# Patient Record
Sex: MALE | Race: WHITE | NOT HISPANIC OR LATINO | Employment: UNEMPLOYED | URBAN - METROPOLITAN AREA
[De-identification: names, ages, dates, MRNs, and addresses within clinical notes are randomized per-mention and may not be internally consistent; named-entity substitution may affect disease eponyms.]

---

## 2017-06-08 ENCOUNTER — HOSPITAL ENCOUNTER (EMERGENCY)
Facility: HOSPITAL | Age: 47
Discharge: HOME | End: 2017-06-08
Attending: EMERGENCY MEDICINE

## 2017-06-08 VITALS
SYSTOLIC BLOOD PRESSURE: 132 MMHG | HEART RATE: 55 BPM | TEMPERATURE: 98 F | BODY MASS INDEX: 34.36 KG/M2 | HEIGHT: 70 IN | DIASTOLIC BLOOD PRESSURE: 78 MMHG | RESPIRATION RATE: 19 BRPM | WEIGHT: 240 LBS | OXYGEN SATURATION: 98 %

## 2017-06-08 DIAGNOSIS — R31.9 URINARY TRACT INFECTION WITH HEMATURIA, SITE UNSPECIFIED: ICD-10-CM

## 2017-06-08 DIAGNOSIS — K59.00 CONSTIPATION: ICD-10-CM

## 2017-06-08 DIAGNOSIS — N39.0 URINARY TRACT INFECTION WITH HEMATURIA, SITE UNSPECIFIED: ICD-10-CM

## 2017-06-08 DIAGNOSIS — R42 DIZZINESS: Primary | ICD-10-CM

## 2017-06-08 LAB
ALBUMIN SERPL BCP-MCNC: 4.3 G/DL
ALP SERPL-CCNC: 70 U/L
ALT SERPL W/O P-5'-P-CCNC: 31 U/L
ANION GAP SERPL CALC-SCNC: 15 MMOL/L
AST SERPL-CCNC: 22 U/L
BACTERIA #/AREA URNS HPF: ABNORMAL /HPF
BASOPHILS # BLD AUTO: 0.04 K/UL
BASOPHILS NFR BLD: 0.6 %
BILIRUB SERPL-MCNC: 0.4 MG/DL
BILIRUB UR QL STRIP: ABNORMAL
BNP SERPL-MCNC: 14 PG/ML
BUN SERPL-MCNC: 11 MG/DL
CALCIUM SERPL-MCNC: 9.8 MG/DL
CHLORIDE SERPL-SCNC: 106 MMOL/L
CK SERPL-CCNC: 85 U/L
CLARITY UR: CLEAR
CO2 SERPL-SCNC: 19 MMOL/L
COLOR UR: YELLOW
CREAT SERPL-MCNC: 1 MG/DL
DIFFERENTIAL METHOD: ABNORMAL
EOSINOPHIL # BLD AUTO: 0 K/UL
EOSINOPHIL NFR BLD: 0.6 %
ERYTHROCYTE [DISTWIDTH] IN BLOOD BY AUTOMATED COUNT: 12.3 %
EST. GFR  (AFRICAN AMERICAN): >60 ML/MIN/1.73 M^2
EST. GFR  (NON AFRICAN AMERICAN): >60 ML/MIN/1.73 M^2
GLUCOSE SERPL-MCNC: 174 MG/DL
GLUCOSE UR QL STRIP: ABNORMAL
HCT VFR BLD AUTO: 45.5 %
HGB BLD-MCNC: 16.6 G/DL
HGB UR QL STRIP: NEGATIVE
KETONES UR QL STRIP: ABNORMAL
LEUKOCYTE ESTERASE UR QL STRIP: NEGATIVE
LYMPHOCYTES # BLD AUTO: 1.7 K/UL
LYMPHOCYTES NFR BLD: 25.6 %
MCH RBC QN AUTO: 31.9 PG
MCHC RBC AUTO-ENTMCNC: 36.5 %
MCV RBC AUTO: 87 FL
MICROSCOPIC COMMENT: ABNORMAL
MONOCYTES # BLD AUTO: 0.6 K/UL
MONOCYTES NFR BLD: 8.5 %
NEUTROPHILS # BLD AUTO: 4.4 K/UL
NEUTROPHILS NFR BLD: 64.7 %
NITRITE UR QL STRIP: NEGATIVE
PH UR STRIP: 6 [PH] (ref 5–8)
PLATELET # BLD AUTO: 232 K/UL
PMV BLD AUTO: 11 FL
POTASSIUM SERPL-SCNC: 3.3 MMOL/L
PROT SERPL-MCNC: 7.8 G/DL
PROT UR QL STRIP: NEGATIVE
RBC # BLD AUTO: 5.21 M/UL
RBC #/AREA URNS HPF: 5 /HPF (ref 0–4)
SODIUM SERPL-SCNC: 140 MMOL/L
SP GR UR STRIP: 1.02 (ref 1–1.03)
TROPONIN I SERPL DL<=0.01 NG/ML-MCNC: 0.01 NG/ML
URN SPEC COLLECT METH UR: ABNORMAL
UROBILINOGEN UR STRIP-ACNC: 1 EU/DL
WBC # BLD AUTO: 6.8 K/UL
WBC #/AREA URNS HPF: >100 /HPF (ref 0–5)
YEAST URNS QL MICRO: ABNORMAL

## 2017-06-08 PROCEDURE — 85025 COMPLETE CBC W/AUTO DIFF WBC: CPT

## 2017-06-08 PROCEDURE — 93005 ELECTROCARDIOGRAM TRACING: CPT

## 2017-06-08 PROCEDURE — 80053 COMPREHEN METABOLIC PANEL: CPT

## 2017-06-08 PROCEDURE — 81000 URINALYSIS NONAUTO W/SCOPE: CPT

## 2017-06-08 PROCEDURE — 96360 HYDRATION IV INFUSION INIT: CPT

## 2017-06-08 PROCEDURE — 84484 ASSAY OF TROPONIN QUANT: CPT

## 2017-06-08 PROCEDURE — 99284 EMERGENCY DEPT VISIT MOD MDM: CPT | Mod: 25

## 2017-06-08 PROCEDURE — 83880 ASSAY OF NATRIURETIC PEPTIDE: CPT

## 2017-06-08 PROCEDURE — 82550 ASSAY OF CK (CPK): CPT

## 2017-06-08 PROCEDURE — 25000003 PHARM REV CODE 250: Performed by: EMERGENCY MEDICINE

## 2017-06-08 PROCEDURE — 93010 ELECTROCARDIOGRAM REPORT: CPT | Mod: ,,, | Performed by: INTERNAL MEDICINE

## 2017-06-08 RX ORDER — MECLIZINE HYDROCHLORIDE 25 MG/1
25 TABLET ORAL
Status: COMPLETED | OUTPATIENT
Start: 2017-06-08 | End: 2017-06-08

## 2017-06-08 RX ORDER — MECLIZINE HYDROCHLORIDE 25 MG/1
25 TABLET ORAL 3 TIMES DAILY PRN
Qty: 20 TABLET | Refills: 0 | Status: SHIPPED | OUTPATIENT
Start: 2017-06-08 | End: 2018-04-03

## 2017-06-08 RX ORDER — CEFUROXIME AXETIL 500 MG/1
500 TABLET ORAL 2 TIMES DAILY
Qty: 20 TABLET | Refills: 0 | Status: SHIPPED | OUTPATIENT
Start: 2017-06-08 | End: 2017-06-18

## 2017-06-08 RX ADMIN — SODIUM CHLORIDE 1000 ML: 0.9 INJECTION, SOLUTION INTRAVENOUS at 03:06

## 2017-06-08 RX ADMIN — MECLIZINE HYDROCHLORIDE 25 MG: 25 TABLET ORAL at 03:06

## 2017-06-08 NOTE — ED PROVIDER NOTES
"SCRIBE #1 NOTE: I, Rissa Hailey, am scribing for, and in the presence of, Avelino Childress MD. I have scribed the entire note.      History      Chief Complaint   Patient presents with    Dizziness     pt reports syncopal episode this morning while sleeping, states "the dizziness woke him from his sleep." he states it feels like a weight is sitting on his chest, denies shortness of breath       Review of patient's allergies indicates:   Allergen Reactions    Pneumococcal 23-phan ps vaccine      Patient refuses injection        HPI   HPI    6/8/2017, 2:56 PM   History obtained from the patient      History of Present Illness: Aravind Dorman is a 47 y.o. male patient, with a PMHx of DM, who presents to the Emergency Department for dizziness which onset gradually earlier today at approximately 12:30 PM. Pt states that dizziness onset after walking up from sleep. Sx have been constant and moderate in severity. No modifying factors noted. Associated sx include nausea. Pt denies any fever, chills, CP, SOB, vomiting, HA, speech difficulty, or weakness/numbness. No further complaints at this time.       Arrival mode: Personal vehicle      PCP: Primary Doctor No       Past Medical History:  Past Medical History:   Diagnosis Date    Anxiety     Depression     Diabetes mellitus     History of psychiatric hospitalization     Hx of psychiatric care     Hypertension     Psychiatric exam requested by authority     Psychiatric problem     Self-harming behavior     Suicide attempt        Past Surgical History:  Reviewed. Not pertinent       Family History:  Reviewed. Not pertinent     Social History:  Social History     Social History Main Topics    Smoking status: Never Smoker    Smokeless tobacco: Unknown     Alcohol use Yes    Drug use: No    Sexual activity: Unknown        ROS   Review of Systems   Constitutional: Negative for chills, diaphoresis and fever.   HENT: Negative for sore throat.    Respiratory: " "Negative for shortness of breath.    Cardiovascular: Negative for chest pain.   Gastrointestinal: Positive for nausea. Negative for abdominal pain, diarrhea and vomiting.   Genitourinary: Negative for dysuria.   Musculoskeletal: Negative for back pain.   Skin: Negative for rash.   Neurological: Positive for dizziness. Negative for weakness, light-headedness, numbness and headaches.   Hematological: Does not bruise/bleed easily.     Physical Exam      Initial Vitals [06/08/17 1426]   BP Pulse Resp Temp SpO2   (!) 176/92 94 20 98.8 °F (37.1 °C) 98 %      Physical Exam  Nursing Notes and Vital Signs Reviewed.  Constitutional: Patient is in no acute distress. Well-developed and well-nourished.  Head: Atraumatic. Normocephalic.  Eyes: PERRL. EOM intact. Conjunctivae are not pale. No scleral icterus.  ENT: Mucous membranes are moist. Oropharynx is clear and symmetric.    Neck: Supple. Full ROM. No lymphadenopathy.  Cardiovascular: Regular rate. Regular rhythm. No murmurs, rubs, or gallops. Distal pulses are 2+ and symmetric.  Pulmonary/Chest: No respiratory distress. Clear to auscultation bilaterally. No wheezing, rales, or rhonchi.  Abdominal: Soft and non-distended.  There is no tenderness.  No rebound, guarding, or rigidity.   Musculoskeletal: Moves all extremities. No obvious deformities. No edema. No calf tenderness.  Skin: Warm and dry.  Neurological:  Alert, awake, and appropriate.  Normal speech.  No acute focal neurological deficits are appreciated.  Psychiatric: Normal affect. Good eye contact. Appropriate in content.    ED Course    Procedures  ED Vital Signs:  Vitals:    06/08/17 1426 06/08/17 1508 06/08/17 1509 06/08/17 1510   BP: (!) 176/92 (!) 150/86  (!) 145/93   Pulse: 94 79 78 84   Resp: 20  13    Temp: 98.8 °F (37.1 °C)      TempSrc: Oral      SpO2: 98% 96% 98%    Weight: 108.9 kg (240 lb)      Height: 5' 10" (1.778 m)       06/08/17 1512 06/08/17 1602 06/08/17 1702   BP: (!) 131/95 (!) 140/84 131/81 "   Pulse: 97 76 (!) 55   Resp:  17 19   Temp:      TempSrc:      SpO2:      Weight:      Height:          Abnormal Lab Results:  Labs Reviewed   CBC W/ AUTO DIFFERENTIAL - Abnormal; Notable for the following:        Result Value    MCH 31.9 (*)     MCHC 36.5 (*)     All other components within normal limits   COMPREHENSIVE METABOLIC PANEL - Abnormal; Notable for the following:     Potassium 3.3 (*)     CO2 19 (*)     Glucose 174 (*)     All other components within normal limits   URINALYSIS - Abnormal; Notable for the following:     Glucose, UA 3+ (*)     Ketones, UA Trace (*)     Bilirubin (UA) 1+ (*)     All other components within normal limits   URINALYSIS MICROSCOPIC - Abnormal; Notable for the following:     RBC, UA 5 (*)     WBC, UA >100 (*)     Bacteria, UA Few (*)     All other components within normal limits   B-TYPE NATRIURETIC PEPTIDE   CK   TROPONIN I        All Lab Results:  Results for orders placed or performed during the hospital encounter of 06/08/17   CBC auto differential   Result Value Ref Range    WBC 6.80 3.90 - 12.70 K/uL    RBC 5.21 4.60 - 6.20 M/uL    Hemoglobin 16.6 14.0 - 18.0 g/dL    Hematocrit 45.5 40.0 - 54.0 %    MCV 87 82 - 98 fL    MCH 31.9 (H) 27.0 - 31.0 pg    MCHC 36.5 (H) 32.0 - 36.0 %    RDW 12.3 11.5 - 14.5 %    Platelets 232 150 - 350 K/uL    MPV 11.0 9.2 - 12.9 fL    Gran # 4.4 1.8 - 7.7 K/uL    Lymph # 1.7 1.0 - 4.8 K/uL    Mono # 0.6 0.3 - 1.0 K/uL    Eos # 0.0 0.0 - 0.5 K/uL    Baso # 0.04 0.00 - 0.20 K/uL    Gran% 64.7 38.0 - 73.0 %    Lymph% 25.6 18.0 - 48.0 %    Mono% 8.5 4.0 - 15.0 %    Eosinophil% 0.6 0.0 - 8.0 %    Basophil% 0.6 0.0 - 1.9 %    Differential Method Automated    Comprehensive metabolic panel   Result Value Ref Range    Sodium 140 136 - 145 mmol/L    Potassium 3.3 (L) 3.5 - 5.1 mmol/L    Chloride 106 95 - 110 mmol/L    CO2 19 (L) 23 - 29 mmol/L    Glucose 174 (H) 70 - 110 mg/dL    BUN, Bld 11 6 - 20 mg/dL    Creatinine 1.0 0.5 - 1.4 mg/dL    Calcium 9.8  8.7 - 10.5 mg/dL    Total Protein 7.8 6.0 - 8.4 g/dL    Albumin 4.3 3.5 - 5.2 g/dL    Total Bilirubin 0.4 0.1 - 1.0 mg/dL    Alkaline Phosphatase 70 55 - 135 U/L    AST 22 10 - 40 U/L    ALT 31 10 - 44 U/L    Anion Gap 15 8 - 16 mmol/L    eGFR if African American >60 >60 mL/min/1.73 m^2    eGFR if non African American >60 >60 mL/min/1.73 m^2   Urinalysis   Result Value Ref Range    Specimen UA Urine, Clean Catch     Color, UA Yellow Yellow, Straw, Mare    Appearance, UA Clear Clear    pH, UA 6.0 5.0 - 8.0    Specific Gravity, UA 1.025 1.005 - 1.030    Protein, UA Negative Negative    Glucose, UA 3+ (A) Negative    Ketones, UA Trace (A) Negative    Bilirubin (UA) 1+ (A) Negative    Occult Blood UA Negative Negative    Nitrite, UA Negative Negative    Urobilinogen, UA 1.0 <2.0 EU/dL    Leukocytes, UA Negative Negative   Brain natriuretic peptide   Result Value Ref Range    BNP 14 0 - 99 pg/mL   CK   Result Value Ref Range    CPK 85 20 - 200 U/L   Troponin I   Result Value Ref Range    Troponin I 0.008 0.000 - 0.026 ng/mL   Urinalysis Microscopic   Result Value Ref Range    RBC, UA 5 (H) 0 - 4 /hpf    WBC, UA >100 (H) 0 - 5 /hpf    Bacteria, UA Few (A) None-Occ /hpf    Yeast, UA None None    Microscopic Comment SEE COMMENT          Imaging Results:  Imaging Results          X-Ray Abdomen Flat And Erect (Final result)  Result time 06/08/17 15:39:03    Final result by Magui Mac MD (06/08/17 15:39:03)                 Impression:     Negative two-view abdominal series.      Electronically signed by: MAGUI MAC MD  Date:     06/08/17  Time:    15:39              Narrative:    Procedure: XR ABDOMEN FLAT AND ERECT, 06/08/17 15:29:27    History: Constipation    Two views of the abdomen. The bowel gas pattern is unremarkable. No obstruction, ileus or free air.    Bony structures are grossly normal.                             X-Ray Chest PA And Lateral (Final result)  Result time 06/08/17 15:38:34    Final  result by Magui Mac MD (06/08/17 15:38:34)                 Impression:         Negative two-view chest x-ray.      Electronically signed by: MAGUI MAC MD  Date:     06/08/17  Time:    15:38              Narrative:    XR CHEST PA AND LATERAL, 06/08/17 15:29:32    Clinical indication: Dizziness.  Shortness of breath.    Findings:   Heart size is normal. The lung fields are clear. No acute pulmonary infiltrate.                                      The Emergency Provider reviewed the vital signs and test results, which are outlined above.    ED Discussion     4:58 PM: Reassessed pt at this time. Pt states that sx have improved after meclizine. Discussed with pt all pertinent ED information and results. Discussed pt dx and plan of tx. Gave pt all f/u and return to the ED instructions. All questions and concerns were addressed at this time. Pt expresses understanding of information and instructions, and is comfortable with plan to discharge. Pt is stable for discharge.      ED Medication(s):  Medications   meclizine tablet 25 mg (25 mg Oral Given 6/8/17 1540)   sodium chloride 0.9% bolus 1,000 mL (1,000 mLs Intravenous New Bag 6/8/17 1540)       New Prescriptions    CEFUROXIME (CEFTIN) 500 MG TABLET    Take 1 tablet (500 mg total) by mouth 2 (two) times daily.    MECLIZINE (ANTIVERT) 25 MG TABLET    Take 1 tablet (25 mg total) by mouth 3 (three) times daily as needed.       Follow-up Information     [facility] in 2 days.    Contact information:  [address] [facility] [address] [phone]                     Medical Decision Making    Medical Decision Making:   Clinical Tests:   Lab Tests: Ordered and Reviewed  Radiological Study: Ordered and Reviewed           Scribe Attestation:   Scribe #1: I performed the above scribed service and the documentation accurately describes the services I performed. I attest to the accuracy of the note.    Attending:   Physician Attestation  Statement for Scribe #1: I, Avelino Childress MD, personally performed the services described in this documentation, as scribed by Rissa Hart, in my presence, and it is both accurate and complete.          Clinical Impression       ICD-10-CM ICD-9-CM   1. Dizziness R42 780.4   2. Constipation K59.00 564.00   3. Urinary tract infection with hematuria, site unspecified N39.0 599.0    R31.9        Disposition:   Disposition: Discharged  Condition: Stable         Avelino Childress MD  06/08/17 4225

## 2018-04-03 ENCOUNTER — HOSPITAL ENCOUNTER (EMERGENCY)
Facility: HOSPITAL | Age: 48
Discharge: ANOTHER HOSPITAL | End: 2018-04-03
Attending: SPECIALIST

## 2018-04-03 VITALS
OXYGEN SATURATION: 96 % | BODY MASS INDEX: 33.04 KG/M2 | WEIGHT: 218 LBS | RESPIRATION RATE: 24 BRPM | SYSTOLIC BLOOD PRESSURE: 112 MMHG | HEIGHT: 68 IN | TEMPERATURE: 98 F | HEART RATE: 80 BPM | DIASTOLIC BLOOD PRESSURE: 52 MMHG

## 2018-04-03 DIAGNOSIS — L03.211 FACIAL CELLULITIS: Primary | ICD-10-CM

## 2018-04-03 DIAGNOSIS — L02.01 FACIAL ABSCESS: ICD-10-CM

## 2018-04-03 LAB
ALBUMIN SERPL BCP-MCNC: 4.7 G/DL
ALP SERPL-CCNC: 92 U/L
ALT SERPL W/O P-5'-P-CCNC: 14 U/L
ANION GAP SERPL CALC-SCNC: 12 MMOL/L
APTT BLDCRRT: 24.5 SEC
AST SERPL-CCNC: 13 U/L
BASOPHILS # BLD AUTO: 0.03 K/UL
BASOPHILS NFR BLD: 0.3 %
BILIRUB SERPL-MCNC: 1.3 MG/DL
BUN SERPL-MCNC: 12 MG/DL
CALCIUM SERPL-MCNC: 10.5 MG/DL
CHLORIDE SERPL-SCNC: 102 MMOL/L
CO2 SERPL-SCNC: 26 MMOL/L
CREAT SERPL-MCNC: 0.9 MG/DL
DIFFERENTIAL METHOD: ABNORMAL
EOSINOPHIL # BLD AUTO: 0 K/UL
EOSINOPHIL NFR BLD: 0.3 %
ERYTHROCYTE [DISTWIDTH] IN BLOOD BY AUTOMATED COUNT: 13.6 %
EST. GFR  (AFRICAN AMERICAN): >60 ML/MIN/1.73 M^2
EST. GFR  (NON AFRICAN AMERICAN): >60 ML/MIN/1.73 M^2
GLUCOSE SERPL-MCNC: 106 MG/DL
HCT VFR BLD AUTO: 46.4 %
HGB BLD-MCNC: 16.2 G/DL
INR PPP: 1
LACTATE SERPL-SCNC: 1.6 MMOL/L
LYMPHOCYTES # BLD AUTO: 2.1 K/UL
LYMPHOCYTES NFR BLD: 19.1 %
MCH RBC QN AUTO: 30.1 PG
MCHC RBC AUTO-ENTMCNC: 34.9 G/DL
MCV RBC AUTO: 86 FL
MONOCYTES # BLD AUTO: 0.9 K/UL
MONOCYTES NFR BLD: 8.4 %
NEUTROPHILS # BLD AUTO: 7.9 K/UL
NEUTROPHILS NFR BLD: 71.9 %
PLATELET # BLD AUTO: 249 K/UL
PMV BLD AUTO: 10.4 FL
POTASSIUM SERPL-SCNC: 3.5 MMOL/L
PROT SERPL-MCNC: 8.2 G/DL
PROTHROMBIN TIME: 10.7 SEC
RBC # BLD AUTO: 5.39 M/UL
SODIUM SERPL-SCNC: 140 MMOL/L
WBC # BLD AUTO: 11.02 K/UL

## 2018-04-03 PROCEDURE — S0077 INJECTION, CLINDAMYCIN PHOSP: HCPCS | Performed by: SPECIALIST

## 2018-04-03 PROCEDURE — 96365 THER/PROPH/DIAG IV INF INIT: CPT

## 2018-04-03 PROCEDURE — 96367 TX/PROPH/DG ADDL SEQ IV INF: CPT

## 2018-04-03 PROCEDURE — 83605 ASSAY OF LACTIC ACID: CPT

## 2018-04-03 PROCEDURE — 25500020 PHARM REV CODE 255: Performed by: SPECIALIST

## 2018-04-03 PROCEDURE — 63600175 PHARM REV CODE 636 W HCPCS: Performed by: SPECIALIST

## 2018-04-03 PROCEDURE — 99285 EMERGENCY DEPT VISIT HI MDM: CPT | Mod: 25

## 2018-04-03 PROCEDURE — 25000003 PHARM REV CODE 250: Performed by: SPECIALIST

## 2018-04-03 PROCEDURE — 96375 TX/PRO/DX INJ NEW DRUG ADDON: CPT

## 2018-04-03 PROCEDURE — 85610 PROTHROMBIN TIME: CPT

## 2018-04-03 PROCEDURE — 85730 THROMBOPLASTIN TIME PARTIAL: CPT

## 2018-04-03 PROCEDURE — 85025 COMPLETE CBC W/AUTO DIFF WBC: CPT

## 2018-04-03 PROCEDURE — 80053 COMPREHEN METABOLIC PANEL: CPT

## 2018-04-03 PROCEDURE — 87040 BLOOD CULTURE FOR BACTERIA: CPT

## 2018-04-03 RX ORDER — CLINDAMYCIN PHOSPHATE 900 MG/50ML
900 INJECTION, SOLUTION INTRAVENOUS
Status: COMPLETED | OUTPATIENT
Start: 2018-04-03 | End: 2018-04-03

## 2018-04-03 RX ORDER — ONDANSETRON 2 MG/ML
4 INJECTION INTRAMUSCULAR; INTRAVENOUS
Status: COMPLETED | OUTPATIENT
Start: 2018-04-03 | End: 2018-04-03

## 2018-04-03 RX ORDER — FENTANYL CITRATE 50 UG/ML
50 INJECTION, SOLUTION INTRAMUSCULAR; INTRAVENOUS
Status: COMPLETED | OUTPATIENT
Start: 2018-04-03 | End: 2018-04-03

## 2018-04-03 RX ORDER — ACETAMINOPHEN 10 MG/ML
1000 INJECTION, SOLUTION INTRAVENOUS
Status: COMPLETED | OUTPATIENT
Start: 2018-04-03 | End: 2018-04-03

## 2018-04-03 RX ORDER — MORPHINE SULFATE 4 MG/ML
4 INJECTION, SOLUTION INTRAMUSCULAR; INTRAVENOUS
Status: COMPLETED | OUTPATIENT
Start: 2018-04-03 | End: 2018-04-03

## 2018-04-03 RX ADMIN — SODIUM CHLORIDE 1000 ML: 0.9 INJECTION, SOLUTION INTRAVENOUS at 11:04

## 2018-04-03 RX ADMIN — CLINDAMYCIN PHOSPHATE 900 MG: 18 INJECTION, SOLUTION INTRAVENOUS at 11:04

## 2018-04-03 RX ADMIN — FENTANYL CITRATE 50 MCG: 50 INJECTION, SOLUTION INTRAMUSCULAR; INTRAVENOUS at 02:04

## 2018-04-03 RX ADMIN — ONDANSETRON 4 MG: 2 INJECTION INTRAMUSCULAR; INTRAVENOUS at 02:04

## 2018-04-03 RX ADMIN — ACETAMINOPHEN 1000 MG: 10 INJECTION, SOLUTION INTRAVENOUS at 11:04

## 2018-04-03 RX ADMIN — IOHEXOL 75 ML: 350 INJECTION, SOLUTION INTRAVENOUS at 01:04

## 2018-04-03 RX ADMIN — MORPHINE SULFATE 4 MG: 4 INJECTION INTRAVENOUS at 03:04

## 2018-04-03 RX ADMIN — SODIUM CHLORIDE 1000 MG: 900 INJECTION, SOLUTION INTRAVENOUS at 12:04

## 2018-04-03 NOTE — ED PROVIDER NOTES
"SCRIBE #1 NOTE: I, Corinne Mack, am scribing for, and in the presence of, Fay Fleming MD. I have scribed the entire note.      History      Chief Complaint   Patient presents with    Facial Swelling     L sided facial swelling since monday. Draining abscess.        Review of patient's allergies indicates:   Allergen Reactions    Pneumococcal 23-phan ps vaccine      Patient refuses injection        HPI   HPI    4/3/2018, 11:11 AM   History obtained from the patient      History of Present Illness: Aravind Dorman is a 48 y.o. male patient with PMHx of DM and HTN who presents to the Emergency Department for L sided facial swelling which onset gradually 2 days ago and worsened today. Pt reports he "popped his facial abscess" and expressed white, green, and brown pus. The abscess began draining shortly after. Pt was seen by his dentist today and it was determined that his facial swelling is not dental related. Symptoms are constant and moderate in severity. No mitigating or exacerbating factors reported. Associated sxs include L sided facial redness, L sided facial pain, and draining L sided facial abscess. Patient denies any fever, chills, dental pain, difficulty swallowing, drooling, visual disturbance, eye pain, neck pain, HA, dizziness, numbness, and all other sxs at this time. No further complaints or concerns at this time.         Arrival mode: Personal vehicle    PCP: Damon Weeks NP       Past Medical History:  Past Medical History:   Diagnosis Date    Anxiety     Depression     Diabetes mellitus     History of psychiatric hospitalization     Hx of psychiatric care     Hypertension     Psychiatric exam requested by authority     Psychiatric problem     Self-harming behavior     Suicide attempt        Past Surgical History:  History reviewed. No pertinent surgical history.      Family History:  History reviewed. No pertinent family history.    Social History:  Social History     Social " History Main Topics    Smoking status: Never Smoker    Smokeless tobacco: Current User     Types: Snuff    Alcohol use Yes    Drug use: No    Sexual activity: Not on file       ROS   Review of Systems   Constitutional: Negative for chills, diaphoresis and fever.   HENT: Positive for facial swelling (L sided). Negative for dental problem, drooling and trouble swallowing.         (+) L sided facial pain  (+) L sided facial redness   Eyes: Negative for pain and visual disturbance.   Respiratory: Negative for cough and shortness of breath.    Cardiovascular: Negative for chest pain and leg swelling.   Gastrointestinal: Negative for abdominal pain, diarrhea, nausea and vomiting.   Musculoskeletal: Negative for back pain, neck pain and neck stiffness.   Skin: Negative for rash and wound.        (+) L sided facial abscess (draining)   Neurological: Negative for dizziness, light-headedness, numbness and headaches.   All other systems reviewed and are negative.    Physical Exam      Initial Vitals [04/03/18 1107]   BP Pulse Resp Temp SpO2   124/81 98 18 100 °F (37.8 °C) 98 %      MAP       95.33          Physical Exam  Nursing Notes and Vital Signs Reviewed.  Constitutional: Patient is in no apparent distress. Well-developed and well-nourished.  Head: Atraumatic. Normocephalic. No submandibular swelling. L sided facial cellulitis. See photo below for further details.                Eyes: PERRL. EOM intact. No pain with EOM. Conjunctivae are not pale. No scleral icterus. No proptosis. No diplopia.  ENT: Mucous membranes are moist. Oropharynx is clear and symmetric.    Neck: Supple. Full ROM. No lymphadenopathy.  Cardiovascular: Regular rate. Regular rhythm. No murmurs, rubs, or gallops. Distal pulses are 2+ and symmetric.  Pulmonary/Chest: No respiratory distress. Clear to auscultation bilaterally. No wheezing or rales.  Abdominal: Soft and non-distended.  There is no tenderness.  No rebound, guarding, or rigidity.  "  Musculoskeletal: Moves all extremities. No obvious deformities. No edema. No calf tenderness.  Skin: Warm and dry.   Neurological:  Alert, awake, and appropriate.  Normal speech.  No acute focal neurological deficits are appreciated.  Psychiatric: Normal affect. Good eye contact. Appropriate in content.    ED Course    Critical Care  Date/Time: 4/3/2018 4:07 PM  Performed by: ROSANNA BAKER  Authorized by: ROSANNA BAKER   Direct patient critical care time: 16 minutes  Additional history critical care time: 4 minutes  Ordering / reviewing critical care time: 9 minutes  Documentation critical care time: 11 minutes  Consulting other physicians critical care time: 9 minutes  Total critical care time (exclusive of procedural time) : 49 minutes  Critical care time was exclusive of teaching time and separately billable procedures and treating other patients.  Critical care was necessary to treat or prevent imminent or life-threatening deterioration of the following conditions: facial abscess   Critical care was time spent personally by me on the following activities: development of treatment plan with patient or surrogate, discussions with consultants, evaluation of patient's response to treatment, examination of patient, obtaining history from patient or surrogate, ordering and performing treatments and interventions, ordering and review of laboratory studies, pulse oximetry, ordering and review of radiographic studies, review of old charts and re-evaluation of patient's condition.        ED Vital Signs:  Vitals:    04/03/18 1107 04/03/18 1132 04/03/18 1303 04/03/18 1501   BP: 124/81 117/68 107/63 109/66   Pulse: 98 90 65 62   Resp: 18 (!) 22 16 20   Temp: 100 °F (37.8 °C)  98.1 °F (36.7 °C)    TempSrc: Oral  Oral    SpO2: 98% 98% 96% 96%   Weight: 98.9 kg (218 lb)      Height: 5' 8" (1.727 m)       04/03/18 1532   BP: 114/78   Pulse: (!) 57   Resp: (!) 24   Temp:    TempSrc:    SpO2: 96%   Weight:    Height: "        Abnormal Lab Results:  Labs Reviewed   CBC W/ AUTO DIFFERENTIAL - Abnormal; Notable for the following:        Result Value    Gran # (ANC) 7.9 (*)     All other components within normal limits   COMPREHENSIVE METABOLIC PANEL - Abnormal; Notable for the following:     Total Bilirubin 1.3 (*)     All other components within normal limits   CULTURE, BLOOD   CULTURE, BLOOD   PROTIME-INR   APTT   LACTIC ACID, PLASMA        All Lab Results:  Results for orders placed or performed during the hospital encounter of 04/03/18   CBC auto differential   Result Value Ref Range    WBC 11.02 3.90 - 12.70 K/uL    RBC 5.39 4.60 - 6.20 M/uL    Hemoglobin 16.2 14.0 - 18.0 g/dL    Hematocrit 46.4 40.0 - 54.0 %    MCV 86 82 - 98 fL    MCH 30.1 27.0 - 31.0 pg    MCHC 34.9 32.0 - 36.0 g/dL    RDW 13.6 11.5 - 14.5 %    Platelets 249 150 - 350 K/uL    MPV 10.4 9.2 - 12.9 fL    Gran # (ANC) 7.9 (H) 1.8 - 7.7 K/uL    Lymph # 2.1 1.0 - 4.8 K/uL    Mono # 0.9 0.3 - 1.0 K/uL    Eos # 0.0 0.0 - 0.5 K/uL    Baso # 0.03 0.00 - 0.20 K/uL    Gran% 71.9 38.0 - 73.0 %    Lymph% 19.1 18.0 - 48.0 %    Mono% 8.4 4.0 - 15.0 %    Eosinophil% 0.3 0.0 - 8.0 %    Basophil% 0.3 0.0 - 1.9 %    Differential Method Automated    Comprehensive metabolic panel   Result Value Ref Range    Sodium 140 136 - 145 mmol/L    Potassium 3.5 3.5 - 5.1 mmol/L    Chloride 102 95 - 110 mmol/L    CO2 26 23 - 29 mmol/L    Glucose 106 70 - 110 mg/dL    BUN, Bld 12 6 - 20 mg/dL    Creatinine 0.9 0.5 - 1.4 mg/dL    Calcium 10.5 8.7 - 10.5 mg/dL    Total Protein 8.2 6.0 - 8.4 g/dL    Albumin 4.7 3.5 - 5.2 g/dL    Total Bilirubin 1.3 (H) 0.1 - 1.0 mg/dL    Alkaline Phosphatase 92 55 - 135 U/L    AST 13 10 - 40 U/L    ALT 14 10 - 44 U/L    Anion Gap 12 8 - 16 mmol/L    eGFR if African American >60 >60 mL/min/1.73 m^2    eGFR if non African American >60 >60 mL/min/1.73 m^2   Protime-INR   Result Value Ref Range    Prothrombin Time 10.7 9.0 - 12.5 sec    INR 1.0 0.8 - 1.2   APTT    Result Value Ref Range    aPTT 24.5 21.0 - 32.0 sec   Lactic acid, plasma   Result Value Ref Range    Lactate (Lactic Acid) 1.6 0.5 - 2.2 mmol/L       Imaging Results:  Imaging Results          CT Maxillofacial With Contrast (Final result)  Result time 04/03/18 14:40:58    Final result by Denny Warner MD (04/03/18 14:40:58)                 Impression:        Extensive soft tissue swelling along the left cheek with probable sinus tract to the skin extending from what appears to be an odontogenic abscess related to left upper molar odontogenic disease. Small subcutaneous abscess within the region of cellulitis. See above.    All CT scans at this facility use dose modulation, iterative reconstruction, and/or weight base dosing when appropriate to reduce radiation dose to as low as reasonably achievable.      Electronically signed by: DENNY WARNER MD  Date:     04/03/18  Time:    14:40              Narrative:    CT ORBITS AND SELLA WITHOUT CONTRAST  CT MAXILLOFACIAL CT WITH CONTRAST.      Technique: 2.5 mm axial images were obtained through the  orbits and sella from the level of the frontal sinuses through the mandible without contrast. 2.5 mm axial CT images were obtained through the maxillofacial region after administration of 75 cc of contrast. Coronal reconstructions were performed on the scanner.     Comparison: None.     History:   Cellulitis     Findings:Opacification of the left maxillary sinus with significant mucosal thickening. Ethmoid mucosal thickening is noted. Air-fluid level seen within the right maxillary sinus consistent with acute sinusitis. Contrast demonstrates extensive soft tissue swelling along the left cheek with probable sinus tract to the skin best seen on image 34 extending from what appears to be an odontogenic abscess. Small subcutaneous abscess within the region of cellulitis. There is expansion at the left upper molar roots suggesting some chronic component of the odontogenic  disease. Within this chronic expansion there is a molar which appears to migrated into the cavity.    No fracture of the maxillofacial region.  Specifically, the orbital walls, paranasal sinus walls, nasal bones, zygomatic arches, ptyergoid plates, maxilla, and mandible are intact. The mandibular condyles are normal in location.        Preseptal cellulitis is noted anterior to the left orbit/globe. No evidence of post septal cellulitis. The globes areotherwise  intact, and there is no retrobulbar hematoma or abnormality of the optic nerves or the extraocular muscles.  Visualized intracranial contents are unremarkable.                             CT Orbits Sella Post Fossa Without Cont (Final result)  Result time 04/03/18 14:40:58    Final result by Denny Warner MD (04/03/18 14:40:58)                 Impression:        Extensive soft tissue swelling along the left cheek with probable sinus tract to the skin extending from what appears to be an odontogenic abscess related to left upper molar odontogenic disease. Small subcutaneous abscess within the region of cellulitis. See above.    All CT scans at this facility use dose modulation, iterative reconstruction, and/or weight base dosing when appropriate to reduce radiation dose to as low as reasonably achievable.      Electronically signed by: DENNY WARNER MD  Date:     04/03/18  Time:    14:40              Narrative:    CT ORBITS AND SELLA WITHOUT CONTRAST  CT MAXILLOFACIAL CT WITH CONTRAST.      Technique: 2.5 mm axial images were obtained through the  orbits and sella from the level of the frontal sinuses through the mandible without contrast. 2.5 mm axial CT images were obtained through the maxillofacial region after administration of 75 cc of contrast. Coronal reconstructions were performed on the scanner.     Comparison: None.     History:   Cellulitis     Findings:Opacification of the left maxillary sinus with significant mucosal thickening. Ethmoid mucosal  thickening is noted. Air-fluid level seen within the right maxillary sinus consistent with acute sinusitis. Contrast demonstrates extensive soft tissue swelling along the left cheek with probable sinus tract to the skin best seen on image 34 extending from what appears to be an odontogenic abscess. Small subcutaneous abscess within the region of cellulitis. There is expansion at the left upper molar roots suggesting some chronic component of the odontogenic disease. Within this chronic expansion there is a molar which appears to migrated into the cavity.    No fracture of the maxillofacial region.  Specifically, the orbital walls, paranasal sinus walls, nasal bones, zygomatic arches, ptyergoid plates, maxilla, and mandible are intact. The mandibular condyles are normal in location.        Preseptal cellulitis is noted anterior to the left orbit/globe. No evidence of post septal cellulitis. The globes areotherwise  intact, and there is no retrobulbar hematoma or abnormality of the optic nerves or the extraocular muscles.  Visualized intracranial contents are unremarkable.                                      The Emergency Provider reviewed the vital signs and test results, which are outlined above.    ED Discussion     3:21 PM: Dr. Fleming discussed the pt's case with Dr. Russ (ENT) who recommends transfer for OMFS.    3:27 PM: Re-evaluated pt. Informed pt and family that there are no OMFS services available at this time. I have discussed test results, shared treatment plan, and the need for transfer with patient and family at bedside. All historical, clinical, radiographic, and laboratory findings were reviewed with the patient/family in detail. Patient will be transferred by [facility] services with cardiac monitoring, SPO2 monitoring, and IV abx care required en route. Patient understands that there could be unforeseen motor vehicle accidents or loss of vital signs that could result in potential death or permanent  disability. Pt and family express understanding at this time and agree with all information. All questions answered. Pt and family have no further questions or concerns at this time. Pt is ready for transfer.     3:43 PM: Consult with Dr. Vences (Emergency Medicine) at [facility] concerning pt. There are no OMFS services, which the patient requires, offered at [facility] at this time. Dr. Vences expresses understanding and will accept transfer.  Accepting Facility: [facility] ED  Accepting Physician: Dr. Vences      ED Medication(s):  Medications   sodium chloride 0.9% bolus 1,000 mL (0 mLs Intravenous Stopped 4/3/18 1403)   acetaminophen (10 mg/mL) injection 1,000 mg (0 mg Intravenous Stopped 4/3/18 1158)   clindamycin 900 MG/50 ML D5W 900 mg/50 mL IVPB 900 mg (0 mg Intravenous Stopped 4/3/18 1220)   vancomycin (VANCOCIN) 1,000 mg in sodium chloride 0.9% 250 mL IVPB (0 mg Intravenous Stopped 4/3/18 1344)   omnipaque 350 iohexol 75 mL (75 mLs Intravenous Given 4/3/18 1337)   fentaNYL injection 50 mcg (50 mcg Intravenous Given 4/3/18 1416)   ondansetron injection 4 mg (4 mg Intravenous Given 4/3/18 1415)   morphine injection 4 mg (4 mg Intravenous Given 4/3/18 1542)       New Prescriptions    No medications on file             Medical Decision Making    Medical Decision Making:   Clinical Tests:   Lab Tests: Ordered and Reviewed  Radiological Study: Ordered and Reviewed           Scribe Attestation:   Scribe #1: I performed the above scribed service and the documentation accurately describes the services I performed. I attest to the accuracy of the note.    Attending:   Physician Attestation Statement for Scribe #1: I, Fay Fleming MD, personally performed the services described in this documentation, as scribed by Corinne Mack, in my presence, and it is both accurate and complete.          Clinical Impression       ICD-10-CM ICD-9-CM   1. Facial cellulitis L03.211 682.0   2. Facial abscess L02.01 682.0        Disposition:   Disposition: Transferred  Condition: Fair           Fay Fleming MD  04/03/18 1073

## 2018-04-08 LAB
BACTERIA BLD CULT: NORMAL
BACTERIA BLD CULT: NORMAL

## 2024-05-08 NOTE — ED NOTES
Called CT for disc.  
Gave report to CARLOS MANUEL Romero  
In bed resting, VSS,aaox3,skin warm dry to touch,equal bilateral clear lung sounds,side rails up x 2,bed locked and in low position, plan of care discussed, oriented to surroundings, instructed to call with any needs.    
[facility] called and informed that there will be a delay due to their pt load.  
[facility] contacted.  
[facility] has arrived to receive pt for transfer.  
Message left with house sup office at [facility] for possible transfer of patient.  
Pt c/o pain to face 10/10, Dr. Fleming notified.   
Report called to Koby HOROWITZ at [facility].  
Spoke with Dia  at [facility] about patient. Dr. Vences is accepting to [facility].  
ambulatory

## 2024-07-01 RX ORDER — ATORVASTATIN CALCIUM 80 MG/1
80 TABLET, FILM COATED ORAL
Qty: 90 TABLET | Refills: 1 | Status: SHIPPED | OUTPATIENT
Start: 2024-07-01

## 2024-07-01 RX ORDER — LISINOPRIL 10 MG/1
10 TABLET ORAL
Qty: 90 TABLET | Refills: 1 | Status: SHIPPED | OUTPATIENT
Start: 2024-07-01

## 2024-07-01 NOTE — TELEPHONE ENCOUNTER
Refill Routing Note   Medication(s) are not appropriate for processing by [facility] for the following reason(s):        Responsible provider unclear    [facility] action(s):  Defer        Medication Therapy Plan: Prescribing provider is different than assigned PCP      Appointments  past 12m or future 3m with PCP    Date Provider   Last Visit   Visit date not found Daniel Quezada MD   Next Visit   Visit date not found Daniel Quezada MD   ED visits in past 90 days: 0        Note composed:11:27 AM 07/01/2024

## 2024-07-30 RX ORDER — CETIRIZINE HYDROCHLORIDE 10 MG/1
10 TABLET ORAL
Qty: 90 TABLET | Refills: 1 | OUTPATIENT
Start: 2024-07-30

## 2024-07-30 NOTE — TELEPHONE ENCOUNTER
Refill Routing Note   Medication(s) are not appropriate for processing by [facility] for the following reason(s):        Patient not seen by provider within 15 months  ED/Hospital Visit since last OV with provider  Responsible provider unclear    [facility] action(s):  Defer               Appointments  past 12m or future 3m with PCP    Date Provider   Last Visit   Visit date not found Daniel Quezada MD   Next Visit   Visit date not found Daniel Quezada MD   ED visits in past 90 days: 0        Note composed:2:48 AM 07/30/2024